# Patient Record
Sex: FEMALE
[De-identification: names, ages, dates, MRNs, and addresses within clinical notes are randomized per-mention and may not be internally consistent; named-entity substitution may affect disease eponyms.]

---

## 2022-09-17 ENCOUNTER — APPOINTMENT (OUTPATIENT)
Dept: AFTER HOURS CARE | Facility: EMERGENCY ROOM | Age: 22
End: 2022-09-17

## 2022-09-18 DIAGNOSIS — R49.1 APHONIA: ICD-10-CM

## 2022-09-18 PROBLEM — Z00.00 ENCOUNTER FOR PREVENTIVE HEALTH EXAMINATION: Status: ACTIVE | Noted: 2022-09-18

## 2022-09-18 PROCEDURE — 99203 OFFICE O/P NEW LOW 30 MIN: CPT | Mod: 95

## 2022-09-18 RX ORDER — PREDNISONE 20 MG/1
20 TABLET ORAL TWICE DAILY
Qty: 8 | Refills: 0 | Status: ACTIVE | COMMUNITY
Start: 2022-09-18 | End: 1900-01-01

## 2022-09-18 NOTE — HISTORY OF PRESENT ILLNESS
[Home] : at home, [unfilled] , at the time of the visit. [Other Location: e.g. Home (Enter Location, City,State)___] : at [unfilled] [Verbal consent obtained from patient] : the patient, [unfilled] [FreeTextEntry8] : 22y F now p/w lost voice x24hrs. Yesterday pt was using her voice more than usual but didn't think it would cause this since it hadnt in the past after similar exertion. Had a sore throat a few days ago but it had been improving.+mild cough, no sore throat. No fever. No other recent illnesses. No sick contacts.

## 2022-09-18 NOTE — PLAN
[No new medications perscribed] : Treat in place: No new medications prescribed [FreeTextEntry1] : reassurance\par will likely self resolve\par anticipatory guidance\par otc remedies - nsaids, apap, saltwater gargles, tea, etc\par pmd follow up

## 2022-09-18 NOTE — ASSESSMENT
[FreeTextEntry1] : pt lost her voice, likely from overextending herself yelling yesterday. No e/o infection, neck/jaw with full ROM.

## 2023-03-12 ENCOUNTER — APPOINTMENT (OUTPATIENT)
Dept: AFTER HOURS CARE | Facility: EMERGENCY ROOM | Age: 23
End: 2023-03-12
Payer: COMMERCIAL

## 2023-03-12 DIAGNOSIS — J02.9 ACUTE PHARYNGITIS, UNSPECIFIED: ICD-10-CM

## 2023-03-12 PROCEDURE — 99214 OFFICE O/P EST MOD 30 MIN: CPT | Mod: 95

## 2023-03-12 NOTE — REVIEW OF SYSTEMS
[Fatigue] : fatigue [Hearing Loss] : no hearing loss [Nasal Discharge] : no nasal discharge [Sore Throat] : sore throat [Negative] : Heme/Lymph

## 2023-03-12 NOTE — HISTORY OF PRESENT ILLNESS
[Other Location: e.g. Home (Enter Location, City,State)___] : at [unfilled] [Home] : at home, [unfilled] , at the time of the visit. [Verbal consent obtained from patient] : the patient, [unfilled] [FreeTextEntry8] : Young f with sore throat since Tuesday.  Over the past 2 weeks she has had sinus congestion, pink eye and now sore throat.  Throat is very painful, she is able to barely tolerate secretions due to pain.  She denies fever but is having cervical lymphadenopathy.  Denies cough.  She went to the [facility] on campus but no testing for strep or mono was done.\par

## 2023-03-12 NOTE — ASSESSMENT
[FreeTextEntry1] : Young f with sore throat, lymphadenopathy, no cough.  I am concerned for strep throat as she is in college and has sick contacts.  Patient meets centor criteria for testing or antibiotics.  Will advise patient to continue salt water gargles, nsaids and will prescribe augmentin.\par

## 2023-03-12 NOTE — PHYSICAL EXAM
[No Acute Distress] : no acute distress [Well Nourished] : well nourished [Well Developed] : well developed [Normal Sclera/Conjunctiva] : normal sclera/conjunctiva [Normal Outer Ear/Nose] : the outer ears and nose were normal in appearance [PERRL] : pupils equal round and reactive to light [No JVD] : no jugular venous distention [Supple] : supple [No Respiratory Distress] : no respiratory distress  [Clear to Auscultation] : lungs were clear to auscultation bilaterally [Regular Rhythm] : with a regular rhythm [Normal Rate] : normal rate  [No Abdominal Bruit] : a ~M bruit was not heard ~T in the abdomen [Soft] : abdomen soft [Non Tender] : non-tender [No CVA Tenderness] : no CVA  tenderness [Grossly Normal Strength/Tone] : grossly normal strength/tone [No Rash] : no rash [Normal Gait] : normal gait [Coordination Grossly Intact] : coordination grossly intact [Normal Affect] : the affect was normal [Normal Insight/Judgement] : insight and judgment were intact [de-identified] : young f in mild distress, no stridor or drooling but seems to have lost her voice [de-identified] : sores in posterior oropharynx, uvula midline, no swelling [de-identified] : + cervical lyjmphadenopathy

## 2023-03-17 RX ORDER — AMOXICILLIN AND CLAVULANATE POTASSIUM 875; 125 MG/1; MG/1
875-125 TABLET, COATED ORAL
Qty: 14 | Refills: 0 | Status: ACTIVE | COMMUNITY
Start: 2023-03-17 | End: 1900-01-01